# Patient Record
Sex: FEMALE | Race: BLACK OR AFRICAN AMERICAN | NOT HISPANIC OR LATINO | Employment: PART TIME | URBAN - METROPOLITAN AREA
[De-identification: names, ages, dates, MRNs, and addresses within clinical notes are randomized per-mention and may not be internally consistent; named-entity substitution may affect disease eponyms.]

---

## 2019-08-09 ENCOUNTER — OFFICE VISIT (OUTPATIENT)
Dept: INTERNAL MEDICINE | Facility: CLINIC | Age: 19
End: 2019-08-09
Payer: COMMERCIAL

## 2019-08-09 ENCOUNTER — LAB VISIT (OUTPATIENT)
Dept: LAB | Facility: HOSPITAL | Age: 19
End: 2019-08-09
Payer: COMMERCIAL

## 2019-08-09 VITALS
TEMPERATURE: 97 F | HEIGHT: 65 IN | WEIGHT: 148.56 LBS | BODY MASS INDEX: 24.75 KG/M2 | DIASTOLIC BLOOD PRESSURE: 62 MMHG | SYSTOLIC BLOOD PRESSURE: 96 MMHG

## 2019-08-09 DIAGNOSIS — Z13.1 SCREENING FOR DIABETES MELLITUS: ICD-10-CM

## 2019-08-09 DIAGNOSIS — R53.83 FATIGUE, UNSPECIFIED TYPE: ICD-10-CM

## 2019-08-09 DIAGNOSIS — Z11.3 ROUTINE SCREENING FOR STI (SEXUALLY TRANSMITTED INFECTION): ICD-10-CM

## 2019-08-09 DIAGNOSIS — F41.1 GENERALIZED ANXIETY DISORDER: Chronic | ICD-10-CM

## 2019-08-09 DIAGNOSIS — Z00.00 PREVENTATIVE HEALTH CARE: ICD-10-CM

## 2019-08-09 DIAGNOSIS — Z11.4 SCREENING FOR HIV WITHOUT PRESENCE OF RISK FACTORS: ICD-10-CM

## 2019-08-09 DIAGNOSIS — F33.1 RECURRENT MAJOR DEPRESSIVE EPISODES, MODERATE: Chronic | ICD-10-CM

## 2019-08-09 DIAGNOSIS — Z00.00 PREVENTATIVE HEALTH CARE: Primary | ICD-10-CM

## 2019-08-09 DIAGNOSIS — Z13.220 SCREENING FOR LIPID DISORDERS: ICD-10-CM

## 2019-08-09 LAB
ALBUMIN SERPL BCP-MCNC: 4.1 G/DL (ref 3.5–5.2)
ALP SERPL-CCNC: 64 U/L (ref 55–135)
ALT SERPL W/O P-5'-P-CCNC: 11 U/L (ref 10–44)
ANION GAP SERPL CALC-SCNC: 11 MMOL/L (ref 8–16)
AST SERPL-CCNC: 14 U/L (ref 10–40)
BASOPHILS # BLD AUTO: 0.07 K/UL (ref 0–0.2)
BASOPHILS NFR BLD: 1.2 % (ref 0–1.9)
BILIRUB SERPL-MCNC: 0.3 MG/DL (ref 0.1–1)
BUN SERPL-MCNC: 8 MG/DL (ref 6–20)
CALCIUM SERPL-MCNC: 9.7 MG/DL (ref 8.7–10.5)
CHLORIDE SERPL-SCNC: 107 MMOL/L (ref 95–110)
CHOLEST SERPL-MCNC: 129 MG/DL (ref 120–199)
CHOLEST/HDLC SERPL: 3.4 {RATIO} (ref 2–5)
CO2 SERPL-SCNC: 24 MMOL/L (ref 23–29)
CREAT SERPL-MCNC: 0.8 MG/DL (ref 0.5–1.4)
DIFFERENTIAL METHOD: NORMAL
EOSINOPHIL # BLD AUTO: 0.1 K/UL (ref 0–0.5)
EOSINOPHIL NFR BLD: 2.1 % (ref 0–8)
ERYTHROCYTE [DISTWIDTH] IN BLOOD BY AUTOMATED COUNT: 12.9 % (ref 11.5–14.5)
EST. GFR  (AFRICAN AMERICAN): >60 ML/MIN/1.73 M^2
EST. GFR  (NON AFRICAN AMERICAN): >60 ML/MIN/1.73 M^2
GLUCOSE SERPL-MCNC: 82 MG/DL (ref 70–110)
HCT VFR BLD AUTO: 38.2 % (ref 37–48.5)
HDLC SERPL-MCNC: 38 MG/DL (ref 40–75)
HDLC SERPL: 29.5 % (ref 20–50)
HGB BLD-MCNC: 12.4 G/DL (ref 12–16)
IMM GRANULOCYTES # BLD AUTO: 0.01 K/UL (ref 0–0.04)
IMM GRANULOCYTES NFR BLD AUTO: 0.2 % (ref 0–0.5)
LDLC SERPL CALC-MCNC: 80.4 MG/DL (ref 63–159)
LYMPHOCYTES # BLD AUTO: 2.4 K/UL (ref 1–4.8)
LYMPHOCYTES NFR BLD: 42.3 % (ref 18–48)
MCH RBC QN AUTO: 27.8 PG (ref 27–31)
MCHC RBC AUTO-ENTMCNC: 32.5 G/DL (ref 32–36)
MCV RBC AUTO: 86 FL (ref 82–98)
MONOCYTES # BLD AUTO: 0.5 K/UL (ref 0.3–1)
MONOCYTES NFR BLD: 9.4 % (ref 4–15)
NEUTROPHILS # BLD AUTO: 2.5 K/UL (ref 1.8–7.7)
NEUTROPHILS NFR BLD: 45 % (ref 38–73)
NONHDLC SERPL-MCNC: 91 MG/DL
NRBC BLD-RTO: 0 /100 WBC
PLATELET # BLD AUTO: 276 K/UL (ref 150–350)
PMV BLD AUTO: 9.7 FL (ref 9.2–12.9)
POTASSIUM SERPL-SCNC: 3.7 MMOL/L (ref 3.5–5.1)
PROT SERPL-MCNC: 7.3 G/DL (ref 6–8.4)
RBC # BLD AUTO: 4.46 M/UL (ref 4–5.4)
SODIUM SERPL-SCNC: 142 MMOL/L (ref 136–145)
TRIGL SERPL-MCNC: 53 MG/DL (ref 30–150)
TSH SERPL DL<=0.005 MIU/L-ACNC: 1.87 UIU/ML (ref 0.4–4)
WBC # BLD AUTO: 5.62 K/UL (ref 3.9–12.7)

## 2019-08-09 PROCEDURE — 86703 HIV-1/HIV-2 1 RESULT ANTBDY: CPT

## 2019-08-09 PROCEDURE — 80061 LIPID PANEL: CPT

## 2019-08-09 PROCEDURE — 85025 COMPLETE CBC W/AUTO DIFF WBC: CPT

## 2019-08-09 PROCEDURE — 99999 PR PBB SHADOW E&M-NEW PATIENT-LVL III: CPT | Mod: PBBFAC,,, | Performed by: FAMILY MEDICINE

## 2019-08-09 PROCEDURE — 99385 PR PREVENTIVE VISIT,NEW,18-39: ICD-10-PCS | Mod: S$PBB,,, | Performed by: FAMILY MEDICINE

## 2019-08-09 PROCEDURE — 80053 COMPREHEN METABOLIC PANEL: CPT

## 2019-08-09 PROCEDURE — 84443 ASSAY THYROID STIM HORMONE: CPT

## 2019-08-09 PROCEDURE — 99999 PR PBB SHADOW E&M-NEW PATIENT-LVL III: ICD-10-PCS | Mod: PBBFAC,,, | Performed by: FAMILY MEDICINE

## 2019-08-09 PROCEDURE — 87591 N.GONORRHOEAE DNA AMP PROB: CPT

## 2019-08-09 PROCEDURE — 36415 COLL VENOUS BLD VENIPUNCTURE: CPT

## 2019-08-09 PROCEDURE — 99385 PREV VISIT NEW AGE 18-39: CPT | Mod: S$PBB,,, | Performed by: FAMILY MEDICINE

## 2019-08-09 PROCEDURE — 87491 CHLMYD TRACH DNA AMP PROBE: CPT

## 2019-08-09 PROCEDURE — 86592 SYPHILIS TEST NON-TREP QUAL: CPT

## 2019-08-09 RX ORDER — BUPROPION HYDROCHLORIDE 150 MG/1
150 TABLET ORAL DAILY
Qty: 30 TABLET | Refills: 1 | Status: SHIPPED | OUTPATIENT
Start: 2019-08-09 | End: 2020-08-08

## 2019-08-09 NOTE — PROGRESS NOTES
CHIEF COMPLAINT  Establish Care      HEALTH MAINTENANCE INTERVENTIONS - UP TO DATE  Health Maintenance Topics with due status: Not Due       Topic Last Completion Date    TETANUS VACCINE 08/22/2011    Chlamydia Screening 08/09/2019       HEALTH MAINTENANCE INTERVENTIONS - DUE OR DUE SOON  There are no preventive care reminders to display for this patient.      SUNI-7, an anxiety screening scale consisting of seven items, each of which is scored 0 to 3, providing a 0 to 21 severity score.  SUNI-7 score = 12 (MEDIUM anxiety)    Panic Disorder Screen: Negative    PHQ-9, a depression screening scale consisting of nine items, each of which is scored 0 to 3, providing a 0 to 27 severity score.  PHQ-9 SCORE = 14 (MODERATE depression)  PSYCHOTIC FEATURES (hallucinations, delusions, paranoia, disorganized speech, etc.) are ABSENT  SUICIDALITY ASSESSMENT: She has no suicidal thoughts or thoughts she would be better off dead.        DIAGNOSES.   1. Preventative health care    2. Screening for HIV without presence of risk factors    3. Routine screening for STI (sexually transmitted infection)    4. Screening for diabetes mellitus    5. Screening for lipid disorders    6. Fatigue, unspecified type    7. Recurrent major depressive episodes, moderate    8. Generalized anxiety disorder        ORDER SUMMARY  Orders Placed This Encounter    C. trachomatis/N. gonorrhoeae by AMP DNA [facility]; Urine    Lipid panel    RPR    HIV 1/2 Ag/Ab (4th Gen)    Comprehensive metabolic panel    CBC auto differential    TSH    buPROPion (WELLBUTRIN XL) 150 MG TB24 tablet       Problem List Items Addressed This Visit        Psychiatric    Generalized anxiety disorder (Chronic)    Relevant Medications    buPROPion (WELLBUTRIN XL) 150 MG TB24 tablet    Recurrent major depressive episodes, moderate (Chronic)    Relevant Medications    buPROPion (WELLBUTRIN XL) 150 MG TB24 tablet      Other Visit Diagnoses     Preventative health care    -  Primary     Relevant Orders    Lipid panel (Completed)    RPR (Completed)    HIV 1/2 Ag/Ab (4th Gen) (Completed)    C. trachomatis/N. gonorrhoeae by AMP DNA [facility]; Urine (Completed)    Comprehensive metabolic panel (Completed)    CBC auto differential (Completed)    Screening for HIV without presence of risk factors        Relevant Orders    HIV 1/2 Ag/Ab (4th Gen) (Completed)    Routine screening for STI (sexually transmitted infection)        Relevant Orders    RPR (Completed)    C. trachomatis/N. gonorrhoeae by AMP DNA [facility]; Urine (Completed)    Screening for diabetes mellitus        Relevant Orders    Comprehensive metabolic panel (Completed)    Screening for lipid disorders        Fatigue, unspecified type        Relevant Orders    Comprehensive metabolic panel (Completed)    CBC auto differential (Completed)    TSH (Completed)          Outpatient Medications Prior to Visit   Medication Sig Dispense Refill    butalbital-acetaminophen-caff -40 mg Cap Take 1 capsule by mouth every 4 (four) hours as needed.  0    etonogestrel (NEXPLANON) 68 mg Impl subdermal device 68 mg by Subdermal route.      etonogestrel (NEXPLANON) 68 mg Impl subdermal device       naproxen (NAPROSYN) 500 MG tablet Take 500 mg by mouth.       No facility-administered medications prior to visit.         Medications Ordered This Encounter   Medications    buPROPion (WELLBUTRIN XL) 150 MG TB24 tablet     Sig: Take 1 tablet (150 mg total) by mouth once daily.     Dispense:  30 tablet     Refill:  1       Medications Discontinued During This Encounter   Medication Reason    etonogestrel (NEXPLANON) 68 mg Impl subdermal device Patient no longer taking    naproxen (NAPROSYN) 500 MG tablet Patient no longer taking        Follow up in about 1 month (around 9/9/2019) for re-evaluate problem(s) discussed today.      REVIEW OF SYSTEMS  CONSTITUTIONAL: No fever reported.  CARDIOVASCULAR: No angina reported.  PULMONARY: No hemoptysis  "reported.  PSYCHIATRIC: No mood instability reported.  NEUROLOGIC: No seizures reported.  ENDOCRINE: No polydipsia reported.  GASTROINTESTINAL: No blood in stool reported.  GENITOURINARY: No hematuria reported.  ENT: No acute hearing changes reported.  OPHTHALMOLOGIC: No acute vision changes reported.  HEMATOLOGIC: No bleeding problems reported.  MUSCULOSKELETAL: No recent injuries reported.  DERMATOLOGIC: No rash reported.  REMAINDER OF COMPLETE REVIEW OF SYSTEMS is negative or noncontributory to present illness except as noted herein.       PHYSICAL EXAM  Vitals:    08/09/19 1024   BP: 96/62   BP Location: Right arm   Patient Position: Sitting   BP Method: Small (Manual)   Temp: 97.4 °F (36.3 °C)   TempSrc: Tympanic   Weight: 67.4 kg (148 lb 9.4 oz)   Height: 5' 5" (1.651 m)     CONSTITUTIONAL: Vital signs noted. No apparent distress. Does not appear acutely ill or septic. Appears adequately hydrated.  EYE: Sclerae anicteric. Lids and conjunctiva unremarkable.  ENT: External ENT unremarkable. Oropharynx moist.  NECK: Trachea midline. Thyroid nontender.  PULM: Lungs clear. Breathing unlabored.  CV: Auscultation reveals regular rate and rhythm without murmur, gallop or rub. No carotid bruit.  GI: Soft and nontender. Bowel sounds normal.  DERM: Skin warm and moist with normal turgor.  PSYCH: Alert and oriented x 3. Mood is grossly neutral. Affect appropriate. Judgment and insight not grossly compromised.      PAST MEDICAL HISTORY  She has a past medical history of Headache and Recurrent major depressive episodes, moderate.    SURGICAL HISTORY  She has no past surgical history on file.    FAMILY HISTORY  Her family history includes Asthma in her mother; Crohn's disease in her mother; No Known Problems in her brother, father, and sister.     ALLERGIES  Review of patient's allergies indicates:  No Known Allergies    SOCIAL HISTORY   TOBACCO USE HISTORY: She reports that she has never smoked. She does not have any " smokeless tobacco history on file.   ALCOHOL USE HISTORY:  She has no alcohol history on file.   RECREATIONAL DRUG USE HISTORY:  She has no drug history on file.    ABOUT THIS DOCUMENTATION:  · Documentation entered by me for this encounter was done in part using speech-recognition technology. Although I have made an effort to ensure accuracy, malapropisms may exist and should be interpreted in context.                        DERREK Ponce MD

## 2019-08-10 LAB
C TRACH DNA SPEC QL NAA+PROBE: NOT DETECTED
N GONORRHOEA DNA SPEC QL NAA+PROBE: NOT DETECTED
RPR SER QL: NORMAL

## 2019-08-12 LAB — HIV 1+2 AB+HIV1 P24 AG SERPL QL IA: NEGATIVE

## 2019-08-31 NOTE — PROGRESS NOTES
"Elaine Raymond.      I'm happy to report that these test results are NORMAL or at least ACCEPTABLE.    Want to learn more about your test results and what they mean? It's as simple as 1, 2, 3.     (1) Log in to your [facility] account at [email or web address]     (2) From the "View test results" tab, click on the test you want to know more about.     (3) Click on the "About This Test" link.    We can discuss your test results further at your next appointment.  If you have any questions, be sure to write them down and bring them to your appointment.  If you have any urgent questions in the meantime, please send me a message using [facility], or you can call my office at [phone].    Thanks for letting me care for you, thanks for trusting us with your healthcare needs, and thanks for using [facility].    Sincerely,    NAYELI Ponce MD"

## 2019-08-31 NOTE — PROGRESS NOTES
"Elaine Raymond.      I'm happy to report that these test results are NORMAL or at least ACCEPTABLE.    Want to learn more about your test results and what they mean? It's as simple as 1, 2, 3.     (1) Log in to your [facility] account at [email or web address]     (2) From the "View test results" tab, click on the test you want to know more about.     (3) Click on the "About This Test" link.    We can discuss your test results further at your next appointment.  If you have any questions, be sure to write them down and bring them to your appointment.  If you have any urgent questions in the meantime, please send me a message using [facility], or you can call my office at [phone].    Thanks for letting me care for you, thanks for trusting us with your healthcare needs, and thanks for using [facility].    Sincerely,    NAYELI Ponce MD"

## 2020-10-06 ENCOUNTER — PATIENT MESSAGE (OUTPATIENT)
Dept: ADMINISTRATIVE | Facility: HOSPITAL | Age: 20
End: 2020-10-06

## 2021-03-25 ENCOUNTER — PATIENT MESSAGE (OUTPATIENT)
Dept: ADMINISTRATIVE | Facility: HOSPITAL | Age: 21
End: 2021-03-25

## 2021-04-29 ENCOUNTER — PATIENT MESSAGE (OUTPATIENT)
Dept: RESEARCH | Facility: HOSPITAL | Age: 21
End: 2021-04-29

## 2021-07-01 ENCOUNTER — PATIENT MESSAGE (OUTPATIENT)
Dept: ADMINISTRATIVE | Facility: OTHER | Age: 21
End: 2021-07-01

## 2021-11-12 ENCOUNTER — HOSPITAL ENCOUNTER (EMERGENCY)
Facility: HOSPITAL | Age: 21
Discharge: HOME | End: 2021-11-12
Attending: FAMILY MEDICINE
Payer: COMMERCIAL

## 2021-11-12 VITALS
TEMPERATURE: 99 F | HEIGHT: 65 IN | RESPIRATION RATE: 18 BRPM | WEIGHT: 154 LBS | OXYGEN SATURATION: 99 % | HEART RATE: 70 BPM | BODY MASS INDEX: 25.66 KG/M2 | DIASTOLIC BLOOD PRESSURE: 59 MMHG | SYSTOLIC BLOOD PRESSURE: 109 MMHG

## 2021-11-12 DIAGNOSIS — R05.9 COUGH: ICD-10-CM

## 2021-11-12 DIAGNOSIS — Z20.822 LAB TEST NEGATIVE FOR COVID-19 VIRUS: ICD-10-CM

## 2021-11-12 DIAGNOSIS — J06.9 UPPER RESPIRATORY TRACT INFECTION, UNSPECIFIED TYPE: Primary | ICD-10-CM

## 2021-11-12 LAB
CTP QC/QA: YES
CTP QC/QA: YES
POC MOLECULAR INFLUENZA A AGN: NEGATIVE
POC MOLECULAR INFLUENZA B AGN: NEGATIVE
SARS-COV-2 RDRP RESP QL NAA+PROBE: NEGATIVE

## 2021-11-12 PROCEDURE — 87502 INFLUENZA DNA AMP PROBE: CPT

## 2021-11-12 PROCEDURE — U0002 COVID-19 LAB TEST NON-CDC: HCPCS | Performed by: PHYSICIAN ASSISTANT

## 2021-11-12 PROCEDURE — 99282 EMERGENCY DEPT VISIT SF MDM: CPT | Mod: 25
